# Patient Record
(demographics unavailable — no encounter records)

---

## 2025-06-27 NOTE — ASSESSMENT
[FreeTextEntry1] : 06/26/25 48 y/o F with hx of 3-stage j-pouch redo who is here today via video visit (pt currently in [place], [place]) for pre-op for EUA/pouchoscopy -discussed enema prep -Risks, benefits, and alternatives discussed with pt. Patient consents to procedure.  CB

## 2025-06-27 NOTE — REASON FOR VISIT
[Telehealth (audio & video)] : This visit was provided via telehealth using real-time 2-way audio visual technology. [Medical Office: ([facility])___] : at the medical office located in  [Other Location: e.g. School (Enter Location, City,State)___] : at [unfilled], at the time of the visit. [FreeTextEntry1] : pre-op for EUA/pouchoscopy [FreeTextEntry2] : 07/01/25

## 2025-06-27 NOTE — PHYSICAL EXAM
[Alert] : alert [Oriented to Person] : oriented to person [Oriented to Place] : oriented to place [Oriented to Time] : oriented to time [Calm] : calm [de-identified] : WDWKIERSTEN [de-identified] : ANICTERIC

## 2025-06-27 NOTE — PHYSICAL EXAM
[Alert] : alert [Oriented to Person] : oriented to person [Oriented to Place] : oriented to place [Oriented to Time] : oriented to time [Calm] : calm [de-identified] : WDWKIERSTEN [de-identified] : ANICTERIC

## 2025-06-27 NOTE — HISTORY OF PRESENT ILLNESS
[Other Location: e.g. School (Enter Location, City,State)___] : at [unfilled], at the time of the visit. [Medical Office: ([facility])___] : at the medical office located in  [Telehealth (audio & video)] : This visit was provided via telehealth using real-time 2-way audio visual technology. 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 She did take imodium yesterday. She is not going a lot at a time, and feels incomplete emptying. She has had some bleeding from irritation and also from her pouch. She was doing better originally. She was very pleased. Yesterday she couldn;t be more than a few steps away from the bathroom. She is eating, no fever. She is eating bland and "safe" food. Her wound is a little tender. This feels like pouchitis to her.  06/26/25 Here today via video visit (pt currently in [place], [place]) for pre-op for EUA/pouchoscopy She is having more than 10 bowel movements per day. She states that she does not feel that she fully empties the pouch and often strains to empty pouch. She states that she feels she has another stricture. Denies passage of blood per anus Not currently taking antimotility agent or soluble fiber supplement Denies fever, cough, chest pain, SOB, nausea, vomiting, leg pain or leg swelling.

## 2025-06-27 NOTE — HISTORY OF PRESENT ILLNESS
[Other Location: e.g. School (Enter Location, City,State)___] : at [unfilled], at the time of the visit. [Medical Office: ([facility])___] : at the medical office located in  [Telehealth (audio & video)] : This visit was provided via telehealth using real-time 2-way audio visual technology. 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 She did take imodium yesterday. She is not going a lot at a time, and feels incomplete emptying. She has had some bleeding from irritation and also from her pouch. She was doing better originally. She was very pleased. Yesterday she couldn;t be more than a few steps away from the bathroom. She is eating, no fever. She is eating bland and "safe" food. Her wound is a little tender. This feels like pouchitis to her.  06/26/25 Here today via video visit (pt currently in [place], [place]) for pre-op for EUA/pouchoscopy She is having more than 10 bowel movements per day. She states that she does not feel that she fully empties the pouch and often strains to empty pouch. She states that she feels she has another stricture. Denies passage of blood per anus Not currently taking antimotility agent or soluble fiber supplement Denies fever, cough, chest pain, SOB, nausea, vomiting, leg pain or leg swelling.

## 2025-06-27 NOTE — REASON FOR VISIT
[Telehealth (audio & video)] : This visit was provided via telehealth using real-time 2-way audio visual technology. [Medical Office: ([facility])___] : at the medical office located in  [Other Location: e.g. School (Enter Location, City,State)___] : at [unfilled], at the time of the visit. [FreeTextEntry1] : pre-op for EUA/pouchoscopy [FreeTextEntry2] : 07/01/25

## 2025-06-27 NOTE — ASSESSMENT
[FreeTextEntry1] : 06/26/25 48 y/o F with hx of 3-stage j-pouch redo who is here today via video visit (pt currently in [place], [place]) for pre-op for EUA/pouchoscopy -discussed enema prep -Risks, benefits, and alternatives discussed with pt. Patient consents to procedure.  CB